# Patient Record
Sex: FEMALE | Race: WHITE
[De-identification: names, ages, dates, MRNs, and addresses within clinical notes are randomized per-mention and may not be internally consistent; named-entity substitution may affect disease eponyms.]

---

## 2017-09-27 ENCOUNTER — HOSPITAL ENCOUNTER (EMERGENCY)
Dept: HOSPITAL 25 - UCCORT | Age: 22
Discharge: HOME | End: 2017-09-27
Payer: COMMERCIAL

## 2017-09-27 DIAGNOSIS — B34.9: Primary | ICD-10-CM

## 2017-09-27 PROCEDURE — G0463 HOSPITAL OUTPT CLINIC VISIT: HCPCS

## 2017-09-27 PROCEDURE — 87502 INFLUENZA DNA AMP PROBE: CPT

## 2017-09-27 PROCEDURE — 99202 OFFICE O/P NEW SF 15 MIN: CPT

## 2017-09-27 NOTE — UC
FLU HPI





- HPI Summary


HPI Summary: 





Patient presents to  with influenza like symptoms.  Endorses body aches, sore 

throat, ear pain, HA, bilateral conjunctivitis with drainage and fevers 

intermittently x 2 days.  Denies sick contacts or travel.  She is otherwise 

healthy and takes no medications.  Cough is without production.  Denies SOB or 

chest pain.





- History of Current Complaint


Chief Complaint: UCRespiratory


Stated Complaint: COUGH,BILATERAL EYE,ACHY


Time Seen by Provider: 09/27/17 11:53


Hx Obtained From: Patient


Hx Last Menstrual Period: 8/22/17


Pregnant?: No


Onset/Duration: Sudden Onset


Severity Currently: Moderate


Severity Initially: Moderate


Pain Intensity: 5


Pain Scale Used: 0-10 Numeric


Associated Signs & Symptoms: Positive: Fever, F/C, Myalgia, Cough, Sore Throat, 

Nasal Congestion


Related Hx: Possible Flu/Infectious Exposure





- Allergy/Home Medications


Allergies/Adverse Reactions: 


 Allergies











Allergy/AdvReac Type Severity Reaction Status Date / Time


 


No Known Allergies Allergy   Verified 09/27/17 11:27











Home Medications: 


 Home Medications





Acetaminophen [Tylenol] 650 mg PO Q4H PRN 09/27/17 [History Confirmed 09/27/17]


Ibuprofen [Advil] 400 mg PO Q4H PRN 09/27/17 [History Confirmed 09/27/17]











PMH/Surg Hx/FS Hx/Imm Hx


Previously Healthy: Yes





- Surgical History


Surgical History: None





- Family History


Known Family History: Positive: Unknown





- Social History


Occupation: Student


Lives: With Family


Alcohol Use: Occasionally


Substance Use Type: None


Smoking Status (MU): Never Smoked Tobacco


Have You Smoked in the Last Year: No





Review of Systems


Constitutional: Fever, Chills, Fatigue


Skin: Negative


Eyes: Drainage, Photophobia


ENT: Sore Throat, Nasal Discharge, Sinus Congestion, Sinus Pain/Tenderness


Respiratory: Cough


Cardiovascular: Negative


Motor: Weakness


Neurovascular: Negative


Musculoskeletal: Arthralgia, Myalgia


Neurological: Negative


Psychological: Negative


Is Patient Immunocompromised?: No


All Other Systems Reviewed And Are Negative: Yes





Physical Exam


Triage Information Reviewed: Yes


Appearance: Ill-Appearing


Vital Signs: 


 Initial Vital Signs











Temp  98.4 F   09/27/17 11:28


 


Pulse  55   09/27/17 11:28


 


Resp  14   09/27/17 11:28


 


BP  112/72   09/27/17 11:28


 


Pulse Ox  100   09/27/17 11:28











Vital Signs Reviewed: Yes


Eyes: Positive: Conjunctiva Inflamed, Discharge


Neck exam: Normal


Neck: Positive: Supple, No Lymphadenopathy


Respiratory Exam: Normal


Respiratory: Positive: Chest non-tender, Lungs clear


Cardiovascular Exam: Normal


Cardiovascular: Positive: RRR


Musculoskeletal Exam: Normal


Musculoskeletal: Positive: Strength Intact


Neurological Exam: Normal


Psychological: Positive: Normal Response To Family, Age Appropriate Behavior


Skin Exam: Normal





Flu Course/Dx





- Course


Course Of Treatment: Inluenza negative.  Lungs CTA.  Bilateral conjunctivitis 

with drainage.  She is treated for conjunctivitis with erythromycin ointment.  

She is encouraged to return if symptoms worsen.  Supportive care given. Note 

out for 3 days of school.  Robitussin with codeine for cough.  Precautions on 

medication given and she understands.  Ok with discharge and plan.





- Differential Dx/Diagnosis


Differential Diagnosis/HQI/PQRI: Bronchitis, Influenza, Upper Respiratory 

Infection


Provider Diagnoses: Viral Syndrome





Discharge





- Discharge Plan


Condition: Stable


Disposition: HOME


Prescriptions: 


Erythromycin OPHTH.OINT* [Ilotycin OPHTH.OINT*] 1 applic BOTH EYES BEDTIME #1 

ophth.oint


guaiFENesin/CODIEN 100MG-10MG* [Robitussin AC 100Mg-10Mg*] 10 ml PO Q6H PRN #

120 udc MDD 40


 PRN Reason: Cough


Patient Education Materials:  Viral Syndrome (ED)


Forms:  *School Release


Additional Instructions: 


Humidifier in the home will help.


Tylenol for discomfort.


Take all medications as directed.


Symptoms should resolve in 1-3 weeks.


If symptoms become worse, please come back to  or go to the ED.


Honey and lemon hot tea


Rest 


plenty of fluids.





Erythromycin ointment up to 6 times daily for conjunctivitis


Robitussin with codeine up to 4 times daily for cough


Do not drive with this medication

## 2018-03-22 ENCOUNTER — HOSPITAL ENCOUNTER (EMERGENCY)
Dept: HOSPITAL 25 - UCCORT | Age: 23
Discharge: HOME | End: 2018-03-22
Payer: COMMERCIAL

## 2018-03-22 VITALS — SYSTOLIC BLOOD PRESSURE: 108 MMHG | DIASTOLIC BLOOD PRESSURE: 55 MMHG

## 2018-03-22 DIAGNOSIS — B86: ICD-10-CM

## 2018-03-22 DIAGNOSIS — R21: Primary | ICD-10-CM

## 2018-03-22 DIAGNOSIS — L29.9: ICD-10-CM

## 2018-03-22 PROCEDURE — 99212 OFFICE O/P EST SF 10 MIN: CPT

## 2018-03-22 PROCEDURE — G0463 HOSPITAL OUTPT CLINIC VISIT: HCPCS

## 2018-03-22 NOTE — UC
Skin Complaint HPI





- HPI Summary


HPI Summary: 





22 y/o female presents to the urgent care c/o rash in all her body except her 

face for the past 2 weeks. Pt reports it itches a lot and it is mostly at night 

time, specially on her wrist and ankles. Pt denies has not use any new body 

lotion, or taking a new medication. Pt denies fever, SOB, throat tightness, 

abdominal pain, N/V/D. Pt has not taking anything to alleviate symptoms. 








- History of Current Complaint


Chief Complaint: UCSkin


Time Seen by Provider: 03/22/18 15:44


Stated Complaint: SKIN COMPLAINT


Hx Obtained From: Patient


Hx Last Menstrual Period: 3/12/18


Pregnant?: No


Onset/Duration: Gradual Onset, Lasting Weeks - 2 weeks, Still Present, Worse 

Since - yesterday


Skin Exposure Onset/Duration: Weeks Ago - 2 weeks


Timing: Constant


Onset Severity: Mild


Current Severity: Moderate


Pain Intensity: 0


Pain Scale Used: 0-10 Numeric


Location: Generalized - exept face


Character: Pruritus


Aggravating Factor(s): Touch


Alleviating Factor(s): OTC Meds


Associated Signs & Symptoms: Positive: Rash


Related History: Possible Reaction to: Insect





- Allergy/Home Medications


Allergies/Adverse Reactions: 


 Allergies











Allergy/AdvReac Type Severity Reaction Status Date / Time


 


No Known Allergies Allergy   Verified 03/22/18 15:34











Home Medications: 


 Home Medications





Norgestimate-Eth Estradiol(NF) [Ortho Tri-Cyclen (NF)] 1 tab PO DAILY 03/22/18 [

History Confirmed 03/22/18]


Sertraline* [Zoloft*] 50 mg PO DAILY 03/22/18 [History Confirmed 03/22/18]











Review of Systems


Constitutional: Negative


Skin: Rash - generalized rash on B/L arms and legs, chest, back and abdomen w/ 

a lot of ithciness


Eyes: Negative


ENT: Negative


Respiratory: Negative


Cardiovascular: Negative


Gastrointestinal: Negative


Genitourinary: Negative


Motor: Negative


Neurovascular: Negative


Musculoskeletal: Negative


Neurological: Negative


Psychological: Negative


Is Patient Immunocompromised?: No


All Other Systems Reviewed And Are Negative: Yes





PMH/Surg Hx/FS Hx/Imm Hx


Previously Healthy: Yes - Pt denies  PMHX





- Surgical History


Surgical History: None





- Family History


Known Family History: Positive: Unknown - Pt doen't know any FMHX





- Social History


Occupation: Student


Lives: With Family


Alcohol Use: Occasionally


Substance Use Type: None


Smoking Status (MU): Never Smoked Tobacco


Have You Smoked in the Last Year: No





Physical Exam





- Summary


Physical Exam Summary: 





Vital Signs Reviewed: Yes


General: well developed, well nourished female sitting in the examining table w/

o any apparent distress.


Eyes: Positive: Conjunctiva Clear - PERRLA, EOMI


ENT: Positive: Normal ENT inspection, Hearing grossly normal, Pharynx normal, 

TMs normal


Neck: Positive: Supple, Nontender, No Lymphadenopathy


Respiratory: Positive: Chest nontender, Lungs clear, Normal breath sounds


Cardiovascular: Positive: RRR, No Murmur, Pulses Normal


Abdomen Description: Positive: Nontender, No Organomegaly, Soft.  Negative: CVA 

Tenderness (R), CVA Tenderness (L)


Bowel Sounds: Positive: Present


Musculoskeletal: Positive: Strength Intact, ROM Intact, No Edema


Neurological Exam: Normal


Psychological Exam: Normal


Skin: Positive: rashes - symmetric linear burrows and erythematous papules on  

the web spaces of B/L  hands, wrists, feet, ankles and lower legs,  abdomen and 

back w/ signs of excoriations.non tender to palpation, no discharge observed








Triage Information Reviewed: Yes


Vital Signs: 


 Initial Vital Signs











Temp  99.4 F   03/22/18 15:36


 


Pulse  51   03/22/18 15:36


 


Resp  18   03/22/18 15:36


 


BP  108/55   03/22/18 15:36


 


Pulse Ox  100   03/22/18 15:36














Course/Dx





- Course


Course Of Treatment: 22 y/o female presents to the urgent care c/o rash in all 

her body except her face for the past 2 weeks. Pt reports it itches a lot and 

it is mostly at night time, specially on her wrist and ankles. Pt denies has 

not use any new body lotion, or taking a new medication. Pt denies fever, SOB, 

throat tightness, abdominal pain, N/V/D. Pt has not taking anything to 

alleviate symptoms. Hx obtained. Pt w/ acute rash, probably scabies. Pt Rx 

Permethrin topical and Benadryl PO and hydrocortisone topical cream to 

alleviate symptoms.  PT educated in how to eradicate and clean clothing and 

beddings. If not improvement of symptoms to f/u w/ PCPfor further treatment. Pt 

agreed w/ D/C instructions.





- Differential Diagnoses - Skin Complaint


Differential Diagnoses: Allergic Reaction, Contact Dermatitis, Local Allergic 

Reaction, Scabies, Urticaria





- Diagnoses


Provider Diagnoses: 1- Acute rash.  2-Scabies.  3-pruritis





Discharge





- Sign-Out/Discharge


Documenting (check all that apply): Discharge





- Discharge Plan


Condition: Stable


Disposition: HOME


Prescriptions: 


diPHENhydraMINE PO* [Benadryl PO 25 MG TAB*] 25 mg PO TID PRN #15 tab


 PRN Reason: Pruritis


Hydrocortisone 1% CREAM* [Hytone Cream 1%*] 1 applic TOPICAL BID #1 tube


Permethrin 5% CREAM* 1 applic TOPICAL SEE INSTRUCTIONS #1 tube


Patient Education Materials:  Scabies (ED), Acute Rash (ED)


Referrals: 


[facility] [place] [facility] [place] - 2 Days


Additional Instructions: 


1-Please apply medication as directed for scabies. If not resolution completely 

use repeat treatment in 10-14 days. Please wash all clothings and beddings w/ 

hot water. Clean mattresses


2- Use the hydrocortisone topical cream to alleviate itching. after you wsh off 

Permethrin topical cream


3-Take Bendayl PO to alleviate itchiness. 


4-If symptoms do not improve or worsen please f/u with your PCP or return to 

the urgent care for further evaluation and treatment.











- Billing Disposition and Condition


Condition: STABLE


Disposition: HOME

## 2018-05-11 ENCOUNTER — HOSPITAL ENCOUNTER (EMERGENCY)
Dept: HOSPITAL 25 - UCCORT | Age: 23
Discharge: HOME | End: 2018-05-11
Payer: COMMERCIAL

## 2018-05-11 VITALS — SYSTOLIC BLOOD PRESSURE: 106 MMHG | DIASTOLIC BLOOD PRESSURE: 49 MMHG

## 2018-05-11 DIAGNOSIS — J03.90: Primary | ICD-10-CM

## 2018-05-11 PROCEDURE — 99212 OFFICE O/P EST SF 10 MIN: CPT

## 2018-05-11 PROCEDURE — 87651 STREP A DNA AMP PROBE: CPT

## 2018-05-11 PROCEDURE — G0463 HOSPITAL OUTPT CLINIC VISIT: HCPCS

## 2018-05-13 NOTE — UC
- Progress Note


Progress Note: 





Pt called - per her request Rx resent to different pharmacy


RN called original pharmacy to cancel RX


resent to preferred pharmacy





RX sent as previously written


Eileen 5/13/2018





Discharge





- Sign-Out/Discharge


Documenting (check all that apply): Discharge/Admit/Transfer





- Discharge Plan


Condition: Stable


Disposition: HOME


Prescriptions: 


Cephalexin CAP* [Keflex CAP*] 500 mg PO BID #20 cap


Cephalexin CAP* [Keflex 500 CAP*] 500 mg PO BID #20 cap


Patient Education Materials:  Tonsillitis (ED)


Referrals: 


Non Staff,Doctor [Primary Care Provider] - 


Additional Instructions: 


recheck for worsening symptoms





recheck in 4 days if not better





trylenol or adivl for pain











- Billing Disposition and Condition


Condition: STABLE


Disposition: HOME